# Patient Record
Sex: FEMALE | Race: WHITE | NOT HISPANIC OR LATINO | URBAN - METROPOLITAN AREA
[De-identification: names, ages, dates, MRNs, and addresses within clinical notes are randomized per-mention and may not be internally consistent; named-entity substitution may affect disease eponyms.]

---

## 2019-04-30 ENCOUNTER — FOLLOW UP ESTABLISHED (OUTPATIENT)
Dept: URBAN - METROPOLITAN AREA CLINIC 51 | Facility: CLINIC | Age: 79
End: 2019-04-30
Payer: MEDICARE

## 2019-04-30 DIAGNOSIS — H43.813 VITREOUS DEGENERATION, BILATERAL: ICD-10-CM

## 2019-04-30 PROCEDURE — 92004 COMPRE OPH EXAM NEW PT 1/>: CPT | Performed by: OPTOMETRIST

## 2019-04-30 ASSESSMENT — KERATOMETRY
OS: 41.93
OD: 42.28

## 2019-04-30 ASSESSMENT — VISUAL ACUITY
OS: 20/25
OD: 20/20

## 2019-04-30 ASSESSMENT — INTRAOCULAR PRESSURE
OS: 18
OD: 19

## 2020-09-25 ENCOUNTER — FOLLOW UP ESTABLISHED (OUTPATIENT)
Dept: URBAN - METROPOLITAN AREA CLINIC 51 | Facility: CLINIC | Age: 80
End: 2020-09-25
Payer: MEDICARE

## 2020-09-25 DIAGNOSIS — H04.123 DRY EYE SYNDROME OF BILATERAL LACRIMAL GLANDS: ICD-10-CM

## 2020-09-25 DIAGNOSIS — H35.363 DRUSEN (DEGENERATIVE) OF MACULA, BILATERAL: ICD-10-CM

## 2020-09-25 PROCEDURE — 92014 COMPRE OPH EXAM EST PT 1/>: CPT | Performed by: OPTOMETRIST

## 2020-09-25 PROCEDURE — 92134 CPTRZ OPH DX IMG PST SGM RTA: CPT | Performed by: OPTOMETRIST

## 2020-09-25 ASSESSMENT — VISUAL ACUITY
OS: 20/25
OD: 20/25

## 2020-09-25 ASSESSMENT — KERATOMETRY
OD: 41.76
OS: 41.88

## 2020-09-25 ASSESSMENT — INTRAOCULAR PRESSURE
OS: 14
OD: 15

## 2021-12-09 ENCOUNTER — OFFICE VISIT (OUTPATIENT)
Dept: URBAN - METROPOLITAN AREA CLINIC 51 | Facility: CLINIC | Age: 81
End: 2021-12-09
Payer: MEDICARE

## 2021-12-09 DIAGNOSIS — H43.313 VITREOUS MEMBRANES AND STRANDS, BILATERAL: ICD-10-CM

## 2021-12-09 DIAGNOSIS — Z96.1 PRESENCE OF INTRAOCULAR LENS: ICD-10-CM

## 2021-12-09 PROCEDURE — 92014 COMPRE OPH EXAM EST PT 1/>: CPT | Performed by: OPTOMETRIST

## 2021-12-09 PROCEDURE — 92134 CPTRZ OPH DX IMG PST SGM RTA: CPT | Performed by: OPTOMETRIST

## 2021-12-09 ASSESSMENT — INTRAOCULAR PRESSURE
OD: 15
OS: 15

## 2021-12-09 ASSESSMENT — VISUAL ACUITY
OD: 20/25
OS: 20/20

## 2021-12-09 ASSESSMENT — KERATOMETRY
OD: 41.55
OS: 42.12

## 2021-12-09 NOTE — IMPRESSION/PLAN
Impression: Drusen (degenerative) of macula, bilateral Plan: Drusen/ RPE changes, no SRF OU. MAC OCT taken today. Stable from previous. Monitor yearly.

## 2021-12-09 NOTE — IMPRESSION/PLAN
Impression: Tear film insufficiency of bilateral lacrimal glands Plan: Recommend AT's at least 1-2 times a day or more OU. Can use more often if needed/symptomatic.